# Patient Record
Sex: MALE | Race: WHITE | NOT HISPANIC OR LATINO | Employment: FULL TIME | URBAN - METROPOLITAN AREA
[De-identification: names, ages, dates, MRNs, and addresses within clinical notes are randomized per-mention and may not be internally consistent; named-entity substitution may affect disease eponyms.]

---

## 2021-03-16 ENCOUNTER — IMMUNIZATION (OUTPATIENT)
Dept: VACCINE CLINIC | Facility: HOSPITAL | Age: 26
End: 2021-03-16

## 2021-03-16 PROCEDURE — 0001A: CPT | Performed by: OBSTETRICS & GYNECOLOGY

## 2021-03-16 PROCEDURE — 91300 HC SARSCOV02 VAC 30MCG/0.3ML IM: CPT | Performed by: OBSTETRICS & GYNECOLOGY

## 2021-04-06 ENCOUNTER — IMMUNIZATION (OUTPATIENT)
Dept: VACCINE CLINIC | Facility: HOSPITAL | Age: 26
End: 2021-04-06

## 2021-04-06 PROCEDURE — 0002A: CPT | Performed by: OBSTETRICS & GYNECOLOGY

## 2021-04-06 PROCEDURE — 91300 HC SARSCOV02 VAC 30MCG/0.3ML IM: CPT | Performed by: OBSTETRICS & GYNECOLOGY

## 2023-01-05 ENCOUNTER — OFFICE VISIT (OUTPATIENT)
Dept: GASTROENTEROLOGY | Facility: CLINIC | Age: 28
End: 2023-01-05
Payer: COMMERCIAL

## 2023-01-05 VITALS — TEMPERATURE: 97.3 F | WEIGHT: 213.8 LBS | BODY MASS INDEX: 28.96 KG/M2 | HEIGHT: 72 IN

## 2023-01-05 DIAGNOSIS — R19.7 DIARRHEA, UNSPECIFIED TYPE: Primary | ICD-10-CM

## 2023-01-05 PROCEDURE — 99203 OFFICE O/P NEW LOW 30 MIN: CPT | Performed by: NURSE PRACTITIONER

## 2023-01-05 NOTE — PROGRESS NOTES
Chief Complaint   Patient presents with   • Diarrhea       HPI    Sidney Hill is a  27 y.o. male here to establish care as a new patient for complaints of diarrhea.    This patient will also follow with Dr. Covarrubias.    Patient reports longstanding history of sensitivity to certain foods can cause diarrhea but for the most part has been passing formed stools without issues up until around Thanksgiving when he developed nausea, vomiting, and 12 days of diarrhea.  He thinks he may have had food poisoning.  He was seen in urgent care given combination of Cipro, Imodium, and Bentyl without lab work or stool testing.  He admits he never took antibiotics but he did use Imodium which caused significant constipation and Bentyl which help with cramps.  He did well up until Thanksgiving when he ate turkey again and symptoms returned but this time only lasted for 3 days.  He has been fine since that time.  Currently passing formed stools without rectal bleeding, rectal pain or weight loss.  He is currently on probiotics which she finds efficacious.    No family history of colon cancer.    No recent labs.    History reviewed. No pertinent past medical history.    No past surgical history on file.    Scheduled Meds:     Continuous Infusions: No current facility-administered medications for this visit.      PRN Meds:     Allergies   Allergen Reactions   • Amoxicillin-Pot Clavulanate Hives       Social History     Socioeconomic History   • Marital status:    Tobacco Use   • Smoking status: Never   • Smokeless tobacco: Never   Vaping Use   • Vaping Use: Never used   Substance and Sexual Activity   • Alcohol use: Yes     Comment: Social drinker   • Drug use: Never   • Sexual activity: Yes     Partners: Female       Family History   Problem Relation Age of Onset   • No Known Problems Mother    • No Known Problems Father        Review of Systems   Constitutional: Negative for activity change, appetite change, fatigue and  unexpected weight change.   HENT: Negative for trouble swallowing.    Eyes: Negative.    Respiratory: Negative.    Cardiovascular: Negative.    Gastrointestinal: Positive for diarrhea. Negative for abdominal distention, abdominal pain, anal bleeding, blood in stool, constipation, nausea, rectal pain and vomiting.   Endocrine: Negative.    Genitourinary: Negative.    Musculoskeletal: Negative.    Allergic/Immunologic: Negative.    Neurological: Negative.    Hematological: Negative.    Psychiatric/Behavioral: Negative.        Vitals:    01/05/23 1254   Temp: 97.3 °F (36.3 °C)       Physical Exam  Constitutional:       Appearance: He is well-developed.   Abdominal:      General: Bowel sounds are normal. There is no distension.      Palpations: Abdomen is soft. There is no mass.      Tenderness: There is no abdominal tenderness. There is no guarding.      Hernia: No hernia is present.   Skin:     General: Skin is warm and dry.      Capillary Refill: Capillary refill takes less than 2 seconds.   Neurological:      Mental Status: He is alert and oriented to person, place, and time.   Psychiatric:         Behavior: Behavior normal.     Assessment    Diagnoses and all orders for this visit:    1. Diarrhea, unspecified type (Primary)  -     Gastrointestinal Panel, PCR - Stool, Per Rectum  -     Clostridioides difficile Toxin, PCR - Stool, Per Rectum  -     Calprotectin, Fecal - Stool, Per Rectum  -     CBC & Differential  -     Comprehensive Metabolic Panel  -     C-reactive Protein  -     Sedimentation Rate  -     Celiac Comprehensive Panel  -     TSH       Plan    Pleasant 27-year-old male seen today for new patient consultation secondary to 2 episodes of diarrhea that occurred around the holidays possibly related to foodborne illness based on description.  We discussed working up his symptoms further with lab work as above and I ordered stool testing if diarrhea returns.  We briefly talked about a colonoscopy and he is  very hesitant would prefer and wait for serology.  Continue probiotics.  Further recommendations and follow-up pending the aforementioned work-up         RADHA Griggs  [facility]  [address] [place], [place] [id number]  Office: [phone]

## 2023-01-06 ENCOUNTER — PATIENT ROUNDING (BHMG ONLY) (OUTPATIENT)
Dept: GASTROENTEROLOGY | Facility: CLINIC | Age: 28
End: 2023-01-06
Payer: COMMERCIAL

## 2023-01-06 LAB
ALBUMIN SERPL-MCNC: 5 G/DL (ref 3.5–5.2)
ALBUMIN/GLOB SERPL: 2.4 G/DL
ALP SERPL-CCNC: 91 U/L (ref 39–117)
ALT SERPL-CCNC: 21 U/L (ref 1–41)
AST SERPL-CCNC: 14 U/L (ref 1–40)
BASOPHILS # BLD AUTO: 0.04 10*3/MM3 (ref 0–0.2)
BASOPHILS NFR BLD AUTO: 0.7 % (ref 0–1.5)
BILIRUB SERPL-MCNC: 0.4 MG/DL (ref 0–1.2)
BUN SERPL-MCNC: 13 MG/DL (ref 6–20)
BUN/CREAT SERPL: 12.9 (ref 7–25)
CALCIUM SERPL-MCNC: 9.4 MG/DL (ref 8.6–10.5)
CHLORIDE SERPL-SCNC: 102 MMOL/L (ref 98–107)
CO2 SERPL-SCNC: 27.2 MMOL/L (ref 22–29)
CREAT SERPL-MCNC: 1.01 MG/DL (ref 0.76–1.27)
CRP SERPL-MCNC: <0.3 MG/DL (ref 0–0.5)
EGFRCR SERPLBLD CKD-EPI 2021: 103.9 ML/MIN/1.73
ENDOMYSIUM IGA SER QL: NEGATIVE
EOSINOPHIL # BLD AUTO: 0.37 10*3/MM3 (ref 0–0.4)
EOSINOPHIL NFR BLD AUTO: 6 % (ref 0.3–6.2)
ERYTHROCYTE [DISTWIDTH] IN BLOOD BY AUTOMATED COUNT: 12.8 % (ref 12.3–15.4)
ERYTHROCYTE [SEDIMENTATION RATE] IN BLOOD BY WESTERGREN METHOD: 3 MM/HR (ref 0–15)
GLIADIN PEPTIDE IGA SER-ACNC: 5 UNITS (ref 0–19)
GLIADIN PEPTIDE IGG SER-ACNC: 5 UNITS (ref 0–19)
GLOBULIN SER CALC-MCNC: 2.1 GM/DL
GLUCOSE SERPL-MCNC: 97 MG/DL (ref 65–99)
HCT VFR BLD AUTO: 44.6 % (ref 37.5–51)
HGB BLD-MCNC: 15.3 G/DL (ref 13–17.7)
IGA SERPL-MCNC: 212 MG/DL (ref 90–386)
IMM GRANULOCYTES # BLD AUTO: 0.01 10*3/MM3 (ref 0–0.05)
IMM GRANULOCYTES NFR BLD AUTO: 0.2 % (ref 0–0.5)
LYMPHOCYTES # BLD AUTO: 1.83 10*3/MM3 (ref 0.7–3.1)
LYMPHOCYTES NFR BLD AUTO: 29.9 % (ref 19.6–45.3)
MCH RBC QN AUTO: 29.9 PG (ref 26.6–33)
MCHC RBC AUTO-ENTMCNC: 34.3 G/DL (ref 31.5–35.7)
MCV RBC AUTO: 87.3 FL (ref 79–97)
MONOCYTES # BLD AUTO: 0.44 10*3/MM3 (ref 0.1–0.9)
MONOCYTES NFR BLD AUTO: 7.2 % (ref 5–12)
NEUTROPHILS # BLD AUTO: 3.43 10*3/MM3 (ref 1.7–7)
NEUTROPHILS NFR BLD AUTO: 56 % (ref 42.7–76)
NRBC BLD AUTO-RTO: 0 /100 WBC (ref 0–0.2)
PLATELET # BLD AUTO: 261 10*3/MM3 (ref 140–450)
POTASSIUM SERPL-SCNC: 4 MMOL/L (ref 3.5–5.2)
PROT SERPL-MCNC: 7.1 G/DL (ref 6–8.5)
RBC # BLD AUTO: 5.11 10*6/MM3 (ref 4.14–5.8)
SODIUM SERPL-SCNC: 140 MMOL/L (ref 136–145)
TSH SERPL DL<=0.005 MIU/L-ACNC: 0.9 UIU/ML (ref 0.27–4.2)
TTG IGA SER-ACNC: 2 U/ML (ref 0–3)
TTG IGG SER-ACNC: 4 U/ML (ref 0–5)
WBC # BLD AUTO: 6.12 10*3/MM3 (ref 3.4–10.8)

## 2023-01-06 NOTE — PROGRESS NOTES
January 6, 2023    Hello, may I speak with Sidney Hill?    My name is Kandy     I am  with [facility]  [address] [facility] [address] [place] [id number] [phone].    Before we get started may I verify your date of birth? 1995    I am calling to officially welcome you to our practice and ask about your recent visit. Is this a good time to talk? Yes    Tell me about your visit with us. What things went well?  Everything was fine. The provider was super nice.         We're always looking for ways to make our patients' experiences even better. Do you have recommendations on ways we may improve?  No.     Overall were you satisfied with your first visit to our practice? Yes       I appreciate you taking the time to speak with me today. Is there anything else I can do for you?       Thank you, and have a great day.

## 2023-01-24 ENCOUNTER — OFFICE VISIT (OUTPATIENT)
Dept: FAMILY MEDICINE CLINIC | Facility: CLINIC | Age: 28
End: 2023-01-24
Payer: COMMERCIAL

## 2023-01-24 VITALS
TEMPERATURE: 98.2 F | DIASTOLIC BLOOD PRESSURE: 82 MMHG | HEART RATE: 67 BPM | WEIGHT: 213.4 LBS | RESPIRATION RATE: 16 BRPM | SYSTOLIC BLOOD PRESSURE: 118 MMHG | HEIGHT: 72 IN | OXYGEN SATURATION: 98 % | BODY MASS INDEX: 28.91 KG/M2

## 2023-01-24 DIAGNOSIS — Z00.00 ENCOUNTER FOR ANNUAL PHYSICAL EXAM: Primary | ICD-10-CM

## 2023-01-24 PROCEDURE — 99395 PREV VISIT EST AGE 18-39: CPT | Performed by: FAMILY MEDICINE

## 2023-01-24 NOTE — PROGRESS NOTES
"  Patient Care Team:  Lisa Suresh MD as PCP - General (Urgent Care)     Chief complaint: Patient is in today for a physical          Patient is a 27 y.o. male who presents for his yearly physical exam.     HPI     To establish care, discuss the followings ;    Has stomach pain for few months , following with GI, labs done recently , all looks ok  Diet: eating HD  Exercise: Do daily exercise   Smoking ; not smoker    Immunizations: Discussed COVID and Tdap shot    Health maintenance/lifestyle:  Immunization History   Administered Date(s) Administered   • COVID-19 (PFIZER) PURPLE CAP 03/16/2021, 04/06/2021         PHQ-2 Depression Screening  Little interest or pleasure in doing things? 0-->not at all   Feeling down, depressed, or hopeless? 0-->not at all   PHQ-2 Total Score 0         Social History     Tobacco Use   Smoking Status Never   Smokeless Tobacco Never     Social History     Substance and Sexual Activity   Alcohol Use Yes    Comment: Social drinker         Review of Systems   Gastrointestinal: Positive for abdominal distention and abdominal pain.   All other systems reviewed and are negative.        History  History reviewed. No pertinent past medical history.   History reviewed. No pertinent surgical history.   Allergies   Allergen Reactions   • Amoxicillin-Pot Clavulanate Hives      Family History   Problem Relation Age of Onset   • No Known Problems Mother    • No Known Problems Father      Social History     Socioeconomic History   • Marital status:    Tobacco Use   • Smoking status: Never   • Smokeless tobacco: Never   Vaping Use   • Vaping Use: Never used   Substance and Sexual Activity   • Alcohol use: Yes     Comment: Social drinker   • Drug use: Never   • Sexual activity: Yes     Partners: Female      No current outpatient medications on file.                  /82   Pulse 67   Temp 98.2 °F (36.8 °C)   Resp 16   Ht 182.9 cm (72\")   Wt 96.8 kg (213 lb 6.4 oz)   SpO2 98%   BMI " 28.94 kg/m²       Physical Exam  Vitals and nursing note reviewed.   Constitutional:       General: He is not in acute distress.     Appearance: He is not ill-appearing, toxic-appearing or diaphoretic.   HENT:      Head: Normocephalic.      Right Ear: Tympanic membrane, ear canal and external ear normal.      Left Ear: Tympanic membrane, ear canal and external ear normal.      Nose: Nose normal. No congestion or rhinorrhea.      Mouth/Throat:      Mouth: Mucous membranes are moist.      Pharynx: Oropharynx is clear. No oropharyngeal exudate or posterior oropharyngeal erythema.   Eyes:      General: No scleral icterus.        Right eye: No discharge.         Left eye: No discharge.      Extraocular Movements: Extraocular movements intact.      Conjunctiva/sclera: Conjunctivae normal.      Pupils: Pupils are equal, round, and reactive to light.   Neck:      Comments: No enlarged thyroid      Cardiovascular:      Rate and Rhythm: Normal rate and regular rhythm.      Heart sounds: Normal heart sounds. No murmur heard.    No friction rub. No gallop.   Pulmonary:      Effort: Pulmonary effort is normal. No respiratory distress.      Breath sounds: Normal breath sounds. No stridor. No wheezing, rhonchi or rales.   Abdominal:      General: Bowel sounds are normal. There is no distension.      Palpations: Abdomen is soft. There is no mass.      Tenderness: There is no abdominal tenderness. There is no right CVA tenderness, left CVA tenderness, guarding or rebound.      Hernia: No hernia is present.   Musculoskeletal:         General: Normal range of motion.      Cervical back: Normal range of motion and neck supple.      Right lower leg: No edema.      Left lower leg: No edema.   Lymphadenopathy:      Cervical: No cervical adenopathy.   Skin:     General: Skin is warm.      Coloration: Skin is not jaundiced or pale.      Findings: No erythema or rash.   Neurological:      General: No focal deficit present.      Mental  Status: He is alert and oriented to person, place, and time.      Cranial Nerves: No cranial nerve deficit.      Sensory: No sensory deficit.      Motor: No weakness.      Coordination: Coordination normal.      Gait: Gait normal.      Deep Tendon Reflexes: Reflexes normal.   Psychiatric:         Mood and Affect: Mood normal.         Behavior: Behavior normal.         Thought Content: Thought content normal.         Judgment: Judgment normal.                   Diagnoses and all orders for this visit:    1. Encounter for annual physical exam (Primary);  - No need for blood work as he just had it few weeks ago, all are ok        -Age and sex appropriate physical exam performed and documented.   -Updated past medical, family, social and surgical histories as well as allergies and care team list.   -Addressed care gaps listed in the medical record.  -Encouraged annual dental and vision exams as part of their overall health.  -Encouraged minimum of 30 minutes or more of exercise at a brisk walk or higher 5 days per week combined with a well-balanced diet.         Follow up: Return in about 1 year (around 1/24/2024) for physical.  Plan of care discussed with pt. They verbalized understanding and agreement.     Lisa Suresh MD   1/24/2023   16:37 EST

## 2023-02-03 ENCOUNTER — TELEPHONE (OUTPATIENT)
Dept: GASTROENTEROLOGY | Facility: CLINIC | Age: 28
End: 2023-02-03
Payer: COMMERCIAL

## 2023-02-03 NOTE — TELEPHONE ENCOUNTER
----- Message from RADHA Griggs sent at 1/9/2023 12:06 PM EST -----  Please inform the patient lab work is normal please complete stool testing.

## 2023-02-03 NOTE — TELEPHONE ENCOUNTER
Called and spoke with patient regarding overdue stool studies. Patient states he has not had a flare recently but will complete once he does.

## 2024-10-30 ENCOUNTER — PATIENT MESSAGE (OUTPATIENT)
Dept: RESEARCH | Facility: HOSPITAL | Age: 29
End: 2024-10-30